# Patient Record
Sex: MALE | Race: WHITE | NOT HISPANIC OR LATINO | URBAN - METROPOLITAN AREA
[De-identification: names, ages, dates, MRNs, and addresses within clinical notes are randomized per-mention and may not be internally consistent; named-entity substitution may affect disease eponyms.]

---

## 2018-05-15 ENCOUNTER — OFFICE VISIT CONVERTED (OUTPATIENT)
Dept: FAMILY MEDICINE CLINIC | Facility: CLINIC | Age: 83
End: 2018-05-15
Attending: NURSE PRACTITIONER

## 2018-05-25 ENCOUNTER — CONVERSION ENCOUNTER (OUTPATIENT)
Dept: SURGERY | Facility: CLINIC | Age: 83
End: 2018-05-25

## 2018-05-25 ENCOUNTER — OFFICE VISIT CONVERTED (OUTPATIENT)
Dept: UROLOGY | Facility: CLINIC | Age: 83
End: 2018-05-25
Attending: UROLOGY

## 2018-06-22 ENCOUNTER — CONVERSION ENCOUNTER (OUTPATIENT)
Dept: SURGERY | Facility: CLINIC | Age: 83
End: 2018-06-22

## 2018-06-22 ENCOUNTER — PROCEDURE VISIT CONVERTED (OUTPATIENT)
Dept: UROLOGY | Facility: CLINIC | Age: 83
End: 2018-06-22
Attending: UROLOGY

## 2018-07-02 ENCOUNTER — OFFICE VISIT CONVERTED (OUTPATIENT)
Dept: FAMILY MEDICINE CLINIC | Facility: CLINIC | Age: 83
End: 2018-07-02
Attending: NURSE PRACTITIONER

## 2018-07-02 ENCOUNTER — CONVERSION ENCOUNTER (OUTPATIENT)
Dept: FAMILY MEDICINE CLINIC | Facility: CLINIC | Age: 83
End: 2018-07-02

## 2018-09-24 ENCOUNTER — PROCEDURE VISIT CONVERTED (OUTPATIENT)
Dept: UROLOGY | Facility: CLINIC | Age: 83
End: 2018-09-24
Attending: UROLOGY

## 2018-10-17 ENCOUNTER — PROCEDURE VISIT CONVERTED (OUTPATIENT)
Dept: UROLOGY | Facility: CLINIC | Age: 83
End: 2018-10-17
Attending: UROLOGY

## 2019-01-07 ENCOUNTER — HOSPITAL ENCOUNTER (OUTPATIENT)
Dept: OTHER | Facility: HOSPITAL | Age: 84
Discharge: HOME | End: 2019-01-07
Attending: INTERNAL MEDICINE

## 2019-01-07 LAB
INR PPP: 3.08 (ref 2–3)
PROTHROMBIN TIME: 28.6 S (ref 9.4–12)

## 2019-02-05 ENCOUNTER — HOSPITAL ENCOUNTER (OUTPATIENT)
Dept: OTHER | Facility: HOSPITAL | Age: 84
Discharge: HOME | End: 2019-02-05
Attending: INTERNAL MEDICINE

## 2019-02-05 LAB
INR PPP: 2.9 (ref 2–3)
PROTHROMBIN TIME: 27 S (ref 9.4–12)

## 2019-03-06 ENCOUNTER — HOSPITAL ENCOUNTER (OUTPATIENT)
Dept: OTHER | Facility: HOSPITAL | Age: 84
Discharge: HOME | End: 2019-03-06
Attending: INTERNAL MEDICINE

## 2019-03-06 LAB
INR PPP: 2.49 (ref 2–3)
PROTHROMBIN TIME: 23.3 S (ref 9.4–12)

## 2019-03-27 ENCOUNTER — PROCEDURE VISIT CONVERTED (OUTPATIENT)
Dept: UROLOGY | Facility: CLINIC | Age: 84
End: 2019-03-27
Attending: UROLOGY

## 2019-03-27 ENCOUNTER — HOSPITAL ENCOUNTER (OUTPATIENT)
Dept: SURGERY | Facility: CLINIC | Age: 84
Discharge: HOME | End: 2019-03-27
Attending: UROLOGY

## 2019-03-27 ENCOUNTER — CONVERSION ENCOUNTER (OUTPATIENT)
Dept: SURGERY | Facility: CLINIC | Age: 84
End: 2019-03-27

## 2019-03-27 ENCOUNTER — HOSPITAL ENCOUNTER (OUTPATIENT)
Dept: OTHER | Facility: HOSPITAL | Age: 84
Discharge: HOME | End: 2019-03-27
Attending: UROLOGY

## 2019-03-27 LAB
ANION GAP SERPL CALC-SCNC: 18 MMOL/L (ref 8–19)
BUN SERPL-MCNC: 38 MG/DL (ref 5–25)
BUN/CREAT SERPL: 23 {RATIO} (ref 6–20)
CALCIUM SERPL-MCNC: 9.5 MG/DL (ref 8.7–10.4)
CHLORIDE SERPL-SCNC: 102 MMOL/L (ref 99–111)
CONV CO2: 24 MMOL/L (ref 22–32)
CREAT UR-MCNC: 1.65 MG/DL (ref 0.7–1.2)
GFR SERPLBLD BASED ON 1.73 SQ M-ARVRAT: 36 ML/MIN/{1.73_M2}
GLUCOSE SERPL-MCNC: 118 MG/DL (ref 70–99)
INR PPP: 1.98 (ref 2–3)
OSMOLALITY SERPL CALC.SUM OF ELEC: 298 MOSM/KG (ref 273–304)
POTASSIUM SERPL-SCNC: 4.8 MMOL/L (ref 3.5–5.3)
PROTHROMBIN TIME: 18.8 S (ref 9.4–12)
SODIUM SERPL-SCNC: 139 MMOL/L (ref 135–147)

## 2019-03-29 LAB
AMOXICILLIN+CLAV SUSC ISLT: 4
AMPICILLIN SUSC ISLT: 8
AMPICILLIN+SULBAC SUSC ISLT: 4
BACTERIA UR CULT: ABNORMAL
CEFAZOLIN SUSC ISLT: <=4
CEFEPIME SUSC ISLT: <=1
CEFTAZIDIME SUSC ISLT: <=1
CEFTRIAXONE SUSC ISLT: <=1
CEFUROXIME ORAL SUSC ISLT: 16
CEFUROXIME PARENTER SUSC ISLT: 16
CIPROFLOXACIN SUSC ISLT: >=4
ERTAPENEM SUSC ISLT: <=0.5
GENTAMICIN SUSC ISLT: <=1
LEVOFLOXACIN SUSC ISLT: >=8
NITROFURANTOIN SUSC ISLT: <=16
TETRACYCLINE SUSC ISLT: 2
TMP SMX SUSC ISLT: <=20
TOBRAMYCIN SUSC ISLT: <=1

## 2019-04-19 ENCOUNTER — PROCEDURE VISIT CONVERTED (OUTPATIENT)
Dept: UROLOGY | Facility: CLINIC | Age: 84
End: 2019-04-19
Attending: UROLOGY

## 2019-04-26 ENCOUNTER — HOSPITAL ENCOUNTER (OUTPATIENT)
Dept: OTHER | Facility: HOSPITAL | Age: 84
Discharge: HOME | End: 2019-04-26
Attending: UROLOGY

## 2019-04-26 LAB
INR PPP: 2.08 (ref 2–3)
PROTHROMBIN TIME: 19.6 S (ref 9.4–12)

## 2019-05-30 ENCOUNTER — HOSPITAL ENCOUNTER (OUTPATIENT)
Dept: OTHER | Facility: HOSPITAL | Age: 84
Discharge: HOME | End: 2019-05-30
Attending: INTERNAL MEDICINE

## 2019-05-30 LAB
INR PPP: 2.79 (ref 2–3)
PROTHROMBIN TIME: 26 S (ref 9.4–12)

## 2019-06-25 ENCOUNTER — HOSPITAL ENCOUNTER (OUTPATIENT)
Dept: OTHER | Facility: HOSPITAL | Age: 84
Discharge: HOME | End: 2019-06-25
Attending: INTERNAL MEDICINE

## 2019-06-25 ENCOUNTER — OFFICE VISIT CONVERTED (OUTPATIENT)
Dept: FAMILY MEDICINE CLINIC | Facility: CLINIC | Age: 84
End: 2019-06-25
Attending: NURSE PRACTITIONER

## 2019-06-25 LAB
INR PPP: 2.77 (ref 2–3)
PROTHROMBIN TIME: 25.8 S (ref 9.4–12)

## 2019-06-26 ENCOUNTER — HOSPITAL ENCOUNTER (OUTPATIENT)
Dept: OTHER | Facility: HOSPITAL | Age: 84
Discharge: HOME | End: 2019-06-26
Attending: NURSE PRACTITIONER

## 2019-06-26 LAB
ALBUMIN SERPL-MCNC: 4.2 G/DL (ref 3.5–5)
ALBUMIN/GLOB SERPL: 1.4 {RATIO} (ref 1.4–2.6)
ALP SERPL-CCNC: 77 U/L (ref 56–155)
ALT SERPL-CCNC: 9 U/L (ref 10–40)
ANION GAP SERPL CALC-SCNC: 18 MMOL/L (ref 8–19)
APPEARANCE UR: ABNORMAL
AST SERPL-CCNC: 13 U/L (ref 15–50)
BASOPHILS # BLD AUTO: 0.02 10*3/UL (ref 0–0.2)
BASOPHILS NFR BLD AUTO: 0.3 % (ref 0–3)
BILIRUB SERPL-MCNC: 0.5 MG/DL (ref 0.2–1.3)
BILIRUB UR QL: NEGATIVE
BUN SERPL-MCNC: 47 MG/DL (ref 5–25)
BUN/CREAT SERPL: 28 {RATIO} (ref 6–20)
CALCIUM SERPL-MCNC: 9.3 MG/DL (ref 8.7–10.4)
CHLORIDE SERPL-SCNC: 101 MMOL/L (ref 99–111)
CHOLEST SERPL-MCNC: 146 MG/DL (ref 107–200)
CHOLEST/HDLC SERPL: 3.4 {RATIO} (ref 3–6)
COLOR UR: YELLOW
CONV ABS IMM GRAN: 0.02 10*3/UL (ref 0–0.2)
CONV BACTERIA: ABNORMAL
CONV CO2: 24 MMOL/L (ref 22–32)
CONV COLLECTION SOURCE (UA): ABNORMAL
CONV CREATININE URINE, RANDOM: 89.6 MG/DL (ref 10–300)
CONV IMMATURE GRAN: 0.3 % (ref 0–1.8)
CONV MICROALBUM.,U,RANDOM: 14.3 MG/L (ref 0–20)
CONV TOTAL PROTEIN: 7.2 G/DL (ref 6.3–8.2)
CONV UROBILINOGEN IN URINE BY AUTOMATED TEST STRIP: 0.2 {EHRLICHU}/DL (ref 0.1–1)
CREAT UR-MCNC: 1.67 MG/DL (ref 0.7–1.2)
DEPRECATED RDW RBC AUTO: 51.3 FL (ref 35.1–43.9)
EOSINOPHIL # BLD AUTO: 0.17 10*3/UL (ref 0–0.7)
EOSINOPHIL # BLD AUTO: 2.9 % (ref 0–7)
ERYTHROCYTE [DISTWIDTH] IN BLOOD BY AUTOMATED COUNT: 14.8 % (ref 11.6–14.4)
FERRITIN SERPL-MCNC: 32 NG/ML (ref 30–300)
FOLATE SERPL-MCNC: >20 NG/ML (ref 4.8–20)
GFR SERPLBLD BASED ON 1.73 SQ M-ARVRAT: 35 ML/MIN/{1.73_M2}
GLOBULIN UR ELPH-MCNC: 3 G/DL (ref 2–3.5)
GLUCOSE SERPL-MCNC: 112 MG/DL (ref 70–99)
GLUCOSE UR QL: NEGATIVE MG/DL
HBA1C MFR BLD: 11.2 G/DL (ref 14–18)
HCT VFR BLD AUTO: 36.2 % (ref 42–52)
HDLC SERPL-MCNC: 43 MG/DL (ref 40–60)
HGB UR QL STRIP: ABNORMAL
IRON SATN MFR SERPL: 32 % (ref 20–55)
IRON SERPL-MCNC: 116 UG/DL (ref 70–180)
KETONES UR QL STRIP: NEGATIVE MG/DL
LDLC SERPL CALC-MCNC: 86 MG/DL (ref 70–100)
LEUKOCYTE ESTERASE UR QL STRIP: ABNORMAL
LYMPHOCYTES # BLD AUTO: 1.4 10*3/UL (ref 1–5)
MCH RBC QN AUTO: 29 PG (ref 27–31)
MCHC RBC AUTO-ENTMCNC: 30.9 G/DL (ref 33–37)
MCV RBC AUTO: 93.8 FL (ref 80–96)
MICROALBUMIN/CREAT UR: 16 MG/G{CRE} (ref 0–25)
MONOCYTES # BLD AUTO: 0.66 10*3/UL (ref 0.2–1.2)
MONOCYTES NFR BLD AUTO: 11.2 % (ref 3–10)
NEUTROPHILS # BLD AUTO: 3.62 10*3/UL (ref 2–8)
NEUTROPHILS NFR BLD AUTO: 61.5 % (ref 30–85)
NITRITE UR QL STRIP: NEGATIVE
NRBC CBCN: 0 % (ref 0–0.7)
OSMOLALITY SERPL CALC.SUM OF ELEC: 299 MOSM/KG (ref 273–304)
PH UR STRIP.AUTO: 6 [PH] (ref 5–8)
PLATELET # BLD AUTO: 203 10*3/UL (ref 130–400)
PMV BLD AUTO: 11.7 FL (ref 9.4–12.4)
POTASSIUM SERPL-SCNC: 4.7 MMOL/L (ref 3.5–5.3)
PROT UR QL: NEGATIVE MG/DL
RBC # BLD AUTO: 3.86 10*6/UL (ref 4.7–6.1)
RBC #/AREA URNS HPF: ABNORMAL /[HPF]
SODIUM SERPL-SCNC: 138 MMOL/L (ref 135–147)
SP GR UR: 1.02 (ref 1–1.03)
T4 FREE SERPL-MCNC: 1.3 NG/DL (ref 0.9–1.8)
TIBC SERPL-MCNC: 362 UG/DL (ref 245–450)
TRANSFERRIN SERPL-MCNC: 253 MG/DL (ref 215–365)
TRIGL SERPL-MCNC: 87 MG/DL (ref 40–150)
TSH SERPL-ACNC: 5.61 M[IU]/L (ref 0.27–4.2)
VARIANT LYMPHS NFR BLD MANUAL: 23.8 % (ref 20–45)
VIT B12 SERPL-MCNC: 680 PG/ML (ref 211–911)
VLDLC SERPL-MCNC: 17 MG/DL (ref 5–37)
WBC # BLD AUTO: 5.89 10*3/UL (ref 4.8–10.8)
WBC #/AREA URNS HPF: ABNORMAL /[HPF]

## 2019-07-26 ENCOUNTER — HOSPITAL ENCOUNTER (OUTPATIENT)
Dept: OTHER | Facility: HOSPITAL | Age: 84
Discharge: HOME | End: 2019-07-26
Attending: INTERNAL MEDICINE

## 2019-07-26 LAB
INR PPP: 2.61 (ref 2–3)
PROTHROMBIN TIME: 24.4 S (ref 9.4–12)

## 2019-08-09 ENCOUNTER — HOSPITAL ENCOUNTER (OUTPATIENT)
Dept: OTHER | Facility: HOSPITAL | Age: 84
Discharge: HOME | End: 2019-08-09
Attending: INTERNAL MEDICINE

## 2019-08-09 LAB
T4 FREE SERPL-MCNC: 1.7 NG/DL (ref 0.9–1.8)
TSH SERPL-ACNC: 0.98 M[IU]/L (ref 0.27–4.2)

## 2019-08-29 ENCOUNTER — HOSPITAL ENCOUNTER (OUTPATIENT)
Dept: OTHER | Facility: HOSPITAL | Age: 84
Discharge: HOME | End: 2019-08-29
Attending: INTERNAL MEDICINE

## 2019-08-29 LAB
INR PPP: 3.4 (ref 2–3)
PROTHROMBIN TIME: 31.6 S (ref 9.4–12)

## 2019-09-12 ENCOUNTER — OFFICE VISIT CONVERTED (OUTPATIENT)
Dept: FAMILY MEDICINE CLINIC | Facility: CLINIC | Age: 84
End: 2019-09-12
Attending: NURSE PRACTITIONER

## 2019-09-18 ENCOUNTER — OFFICE VISIT CONVERTED (OUTPATIENT)
Dept: FAMILY MEDICINE CLINIC | Facility: CLINIC | Age: 84
End: 2019-09-18
Attending: NURSE PRACTITIONER

## 2019-09-23 ENCOUNTER — HOSPITAL ENCOUNTER (OUTPATIENT)
Dept: OTHER | Facility: HOSPITAL | Age: 84
Discharge: HOME | End: 2019-09-23
Attending: INTERNAL MEDICINE

## 2019-09-23 LAB
INR PPP: 3.36 (ref 2–3)
PROTHROMBIN TIME: 31.2 S (ref 9.4–12)

## 2019-10-23 ENCOUNTER — PROCEDURE VISIT CONVERTED (OUTPATIENT)
Dept: UROLOGY | Facility: CLINIC | Age: 84
End: 2019-10-23
Attending: UROLOGY

## 2019-10-29 ENCOUNTER — HOSPITAL ENCOUNTER (OUTPATIENT)
Dept: OTHER | Facility: HOSPITAL | Age: 84
Discharge: HOME | End: 2019-10-29
Attending: INTERNAL MEDICINE

## 2019-10-29 LAB
INR PPP: 3.9 (ref 2–3)
PROTHROMBIN TIME: 37.8 S (ref 9.4–12)

## 2019-12-10 ENCOUNTER — OFFICE VISIT CONVERTED (OUTPATIENT)
Dept: SURGERY | Facility: CLINIC | Age: 84
End: 2019-12-10
Attending: PHYSICIAN ASSISTANT

## 2021-05-07 NOTE — PROGRESS NOTES
"   Progress Note      Patient Name: Christoph Qureshi   Patient ID: [id number]   Sex: Male   YOB: 1928        Visit Date: May 15, 2018    Provider: BOBBY Ladd   Location: [facility]   Location Address: [address] [place] [address]  [id number]   Location Phone: [phone]          Chief Complaint     PATIENT IS HERE BECAUSE [place] [facility]  BECAUSE HIS NECK WAS HURTING AND STIFF.  THEY GAVE HIM A MUSCLE RELAXER SHOT AND GAVE HIM SOME GEL TO PUT ON IT.  THEY HAVE THE GEL WITH THEM.  HE ALSO HAS HIS PAPER WORK FROM THE ER WITH HIM.       History Of Present Illness  Christoph Qureshi is a 89 year old /White male who presents for evaluation and treatment of:      follow up from ER for severe neck pain--    He could hardly turn his head on Saturday or Sunday so he finally broke down and went to the ER yesterday. He states that they gave him a \"muscle relaxer\" shot and he is a lot better today. They also gave him topical Voltaren gel. He has been prescribed this in the past; however, Dr. Salgado doesn't want him using any NSAIDs due to the warfarin--not even a baby aspirin.       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 --  Left kidney         Past Surgical History  Procedure Name Date Notes   CABG --  1997   Gallbladder --  --    Hip relacement, left --  --    Hip replacement, right --  --    Kidney removal --  left 2017   Valve replacement, aortic and mitral --  --          Medication List  Name Date Started Instructions   ferrous sulfate 325 mg (65 mg iron) oral tablet  take 1 tablet (325 mg) by oral route once daily   furosemide 40 mg oral tablet  take 1 tablet (40 mg) by oral route once daily   levothyroxine 50 mcg oral " "tablet 03/06/2018 TAKE 1 TABLET DAILY.   lisinopril 5 mg oral tablet  take 1 tablet (5 mg) by oral route once daily   metoprolol succinate 25 mg oral tablet extended release 24 hr  take 1 tablet (25 mg) by oral route once daily   Probiotic 15 billion cell oral capsule  take 2 capsules by oral route   simvastatin 80 mg oral tablet  take 1 tablet by oral route   warfarin 5 mg oral tablet  take 1 & 1/2-2 tablets by mouth         Allergy List  Allergen Name Date Reaction Notes   Codiene --  --  --          Family Medical History  Disease Name Relative/Age Notes   Cancer, Unspecified / --     Sister/          Social History  Finding Status Start/Stop Quantity Notes   Alcohol Never --/-- --  --    lives with spouse --  --/-- --  --    Retired --  --/-- --  --    Tobacco Never --/-- --  --          Review of Systems  · Constitutional  o Admits  o : good general health lately  · Cardiovascular  o Denies  o : chest pain, dyspnea on exertion  · Respiratory  o Denies  o : shortness of breath, wheezing, cough  · Gastrointestinal  o Denies  o : nausea, vomiting, diarrhea, abdominal pain  · Neurologic  o Denies  o : tingling or numbness  · Musculoskeletal  o Admits  o : limitation of motion, neck pain      Vitals  Date Time BP Position Site L\R Cuff Size HR RR TEMP(F) WT  HT  BMI kg/m2 BSA m2 O2 Sat        05/15/2018 12:34 /64 Sitting    81 - R  98.3 155lbs 4oz 5'  4\" 26.65 1.78 97 %           Physical Examination  · Constitutional  o Appearance  o : well developed, well-nourished, in no acute distress  · Neck  o Inspection/Palpation  o : supple  o Thyroid  o : no thyromegaly  · Respiratory  o Respiratory Effort  o : breathing unlabored  o Auscultation of Lungs  o : clear to ascultation  · Cardiovascular  o Heart  o :   § Auscultation of Heart  § : regular rate and rhythm  o Peripheral Vascular System  o :   § Extremities  § : no edema  · Lymphatic  o Neck  o : no lymphadenopathy present  · Musculoskeletal  o General  o : " "  § General Musculoskeletal  § : No joint swelling or deformity. Muscle tone, strength, and development grossly normal.  · Skin and Subcutaneous Tissue  o General Inspection  o : no lesions present, no areas of discoloration, skin turgor normal, texture normal  · Neurologic  o Gait and Station  o :   § Gait Screening  § : normal gait  · Psychiatric  o Mood and Affect  o : mood normal, affect appropriate  · Cervical Spine  o Inspection  o : no redness, no swelling, no atrophy, no deformity, no mass  o Palpation  o : tednerness present on the left (paraspinal), no crepitus, no masses  o Range of Motion  o : flexion normal, hyperextension normal, lateral bending normal, axial rotation abnormal               Assessment  · Cervical pain (neck)     723.1/M54.2  · DDD (degenerative disc disease), cervical     722.4/M50.30      Plan  · Orders  o ACO-19: Colorectal cancer screening results documented and reviewed (3017F) - - 05/15/2018   Normal in 5/2014--Dr. Hernandez  o ACO-39: Current medications updated and reviewed () - - 05/15/2018  o Xray cervical spine complete [facility] ([id number]) - 723.1/M54.2 - 05/15/2018   Severe arthritic and degenerative disc disease changes of the cervical spine.  · Medications  o cyclobenzaprine 5 mg oral tablet   SIG: take 1 tablet (5 mg) by oral route 3 times per day as needed for neck pain. hold for somnolence   DISP: (21) tablets with 0 refills  Prescribed on 05/15/2018     · Instructions  o Patient was educated/instructed on their diagnosis, treatment and medications prior to discharge from the clinic today. He has severe DDD of the C-spine and arthritis--he cannot take NSAIDs--neck pain is improving with \"muscle relaxer shot\", although ER records do not show any injections given. I have recommended PRN use of Flexeril, only 5mg as needed. He is going to verify with Dr. Salgado if this is okay and let me know.   · Disposition  o Call or Return if symptoms worsen or " persist.            Electronically Signed by: BOBBY Ladd -Author on May 15, 2018 01:54:09 PM

## 2021-05-07 NOTE — PROGRESS NOTES
Progress Note      Patient Name: Christoph Qureshi   Patient ID: [id number]   Sex: Male   YOB: 1928        Visit Date: June 25, 2019    Provider: BOBBY Ladd   Location: [facility]   Location Address: [address] [place] [address] [place]  [address]   Location Phone: [phone]          Chief Complaint     refill on thyroid med       History Of Present Illness  Christoph Qureshi is a 89 year old /White male who presents for evaluation and treatment of:      follow up on chronic health conditions -- needs thyroid labs and levothyroxine.     Kidney cancer -- he had surgery 2 years ago to remove the left kidney -- and he still follows up every 6 months. He recently had a cystoscopy in March and all was well.     CHF/HTN/HLD -- he sees Dr. Salgado about once per year -- he hasn't checked any labs in 2 years per his wife. He is prescribed furosemide, warfarin, metoprolol, Lisinopril, and simvastatin.     Hypothyroidism -- no change in fatigue -- he tires easily, but no change from baseline. No issues with hair. He bruises easily d/t warfarin. No change in nails. No issues with constipation.       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 --  Left kidney         Past Surgical History  Procedure Name Date Notes   CABG --  1997   Gallbladder --  --    Hip relacement, left --  --    Hip replacement, right --  --    Kidney removal --  left 2017   Valve replacement, aortic and mitral --  --          Medication List  Name Date Started Instructions   furosemide 40 mg oral tablet  take 1 tablet (40 mg) by oral route once daily   levothyroxine 50 mcg oral tablet 09/19/2018 take 1 tablet by oral route QD for 90 days, take 1 xtra pill on Saturday.   lisinopril 5 mg oral  tablet  take 1 tablet (5 mg) by oral route once daily   metoprolol succinate 25 mg oral tablet extended release 24 hr  take 1 tablet (25 mg) by oral route once daily   simvastatin 80 mg oral tablet  take 1 tablet by oral route   warfarin 5 mg oral tablet  take 1 & 1/2-2 tablets by mouth         Allergy List  Allergen Name Date Reaction Notes   Codiene --  --  --          Family Medical History  Disease Name Relative/Age Notes   Cancer, Unspecified Sister/   --          Social History  Finding Status Start/Stop Quantity Notes   Alcohol Never --/-- --  --    lives with spouse --  --/-- --  --    Retired --  --/-- --  --    Tobacco Former --/-- --  quit 1959         Immunizations  NameDate Admin Mfg Trade Name Lot Number Route Inj VIS Given VIS Publication   Gvqafyceh77/21/2018 [facility] FLUZONE-HIGH DOSE [id number] IM RD 09/21/2018 08/07/2015   Comments: [id number]         Review of Systems  · Constitutional  o Admits  o : fatigue  o Denies  o : fever, chills  · Eyes  o Denies  o : changes in vision  · HENT  o Denies  o : headaches, vertigo, lightheadedness  · Cardiovascular  o Denies  o : chest pain, syncope, dyspnea on exertion, lower extremity edema, palpitations  · Respiratory  o Denies  o : shortness of breath  · Gastrointestinal  o Denies  o : nausea, vomiting, diarrhea, abdominal pain  · Genitourinary  o Denies  o : dysuria, urinary retention  · Integument  o Denies  o : pigmentation changes, nail changes  · Neurologic  o Denies  o : dizziness  · Musculoskeletal  o Denies  o : joint pain, joint swelling  · Endocrine  o Denies  o : polyuria, polydipsia, loss of hair, cold intolerance, heat intolerance  · Heme-Lymph  o Admits  o : easy bruising  o Denies  o : easy bleeding, petechiae      Vitals  Date Time BP Position Site L\R Cuff Size HR RR TEMP (F) WT  HT  BMI kg/m2 BSA m2 O2 Sat        06/25/2019 10:58 /80 Sitting    78 - R  97.4 159lbs 0oz    100 %          Physical  Examination  · Constitutional  o Appearance  o : well developed, well-nourished, in no acute distress  · Eyes  o Conjunctivae  o : conjunctivae normal, no exudates present  · Neck  o Inspection/Palpation  o : normal appearance, no masses or tenderness, trachea midline  o Thyroid  o : no thyromegaly  · Respiratory  o Respiratory Effort  o : breathing unlabored  o Auscultation of Lungs  o : clear to ascultation  · Cardiovascular  o Heart  o :   § Auscultation of Heart  § : regular rate, irregular rhythm present, no murmurs present  o Peripheral Vascular System  o :   § Carotid Arteries  § : normal pulses bilaterally, no bruits present  § Pedal Pulses  § : bilateral pulse strength 2+  § Extremities  § : no edema  · Lymphatic  o Neck  o : no lymphadenopathy present  · Musculoskeletal  o General  o :   § General Musculoskeletal  § : Muscle tone, strength, and development grossly normal.  · Skin and Subcutaneous Tissue  o General Inspection  o : no lesions present, no areas of discoloration, skin turgor normal, texture normal  · Neurologic  o Gait and Station  o :   § Gait Screening  § : normal gait  · Psychiatric  o Mood and Affect  o : mood normal, affect appropriate          Assessment  · Anemia     285.9/D64.9  · Essential hypertension     401.9/I10  · Hyperlipidemia     272.4/E78.5  · Hypothyroidism     244.9/E03.9  · Congestive Heart Failure     428.0/I50.9      Plan  · Orders  o CBC with Auto Diff [facility] (42570) - 401.9/I10, 272.4/E78.5, 285.9/D64.9, 244.9/E03.9 - 06/25/2019  o CMP [facility] (58095) - 401.9/I10, 272.4/E78.5, 285.9/D64.9, 244.9/E03.9 - 06/25/2019  o Lipid Panel [facility] (66883) - 401.9/I10, 272.4/E78.5 - 06/25/2019  o Thyroid Profile (THYII) - 244.9/E03.9 - 06/25/2019  o Urinalysis with Reflex Microscopy if abnormal ([facility]) (09880) - 401.9/I10, 272.4/E78.5 - 06/25/2019  o Urine microalbumin (57707) - 401.9/I10, 272.4/E78.5 - 06/25/2019  o ACO-13: Fall Risk Screening with no falls in past year or only one fall  without injury in the past year (1101F) - - 06/25/2019  o ACO-39: Current medications updated and reviewed () - - 06/25/2019  o Iron Profile (Iron 67137 TIBC 93506 and Transferrin 96863) (IRONP) - 285.9/D64.9 - 06/25/2019  o Ferritin ser/plas (77258) - 285.9/D64.9 - 06/25/2019  o B12 Folate levels (B12FO) - 285.9/D64.9 - 06/25/2019  · Instructions  o Patient advised to monitor blood pressure (B/P) at home and journal readings. Patient informed that a B/P reading at home of more than 135/85 is considered hypertension. For readings greater javi444/90 or higher patient is advised to follow up in the office with readings for management. Patient advised to limit sodium intake.  o Take all medications as prescribed/directed.  o Patient was educated/instructed on their diagnosis, treatment and medications prior to discharge from the clinic today. Patient does not see nephrology -- if eGFR still low with this lab draw then he will be referred -- voices understanding and is okay to be seen in [place].   o Chronic conditions reviewed and taken into consideration for today's treatment plan.  · Disposition  o Call or Return if symptoms worsen or persist.            Electronically Signed by: BOBBY Ladd -Author on June 25, 2019 11:32:38 AM
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
## 2021-05-07 NOTE — PROGRESS NOTES
Progress Note      Patient Name: Christoph Qureshi   Patient ID: [id number]   Sex: Male   YOB: 1928    Referring Provider: Tala ROSALES    Visit Date: July 2, 2018    Provider: BOBBY Byrd   Location: [facility]   Location Address: [address] [place] [address]  [id number]   Location Phone: [phone]          Chief Complaint     refill levothyroxine and labs if needed       History Of Present Illness  Christoph Qureshi is a 89 year old /White male who presents for evaluation and treatment of:      chronic  health conditions and rf medications     Had kidney removed last year for left kidney cancer- had follow up a few weeks ago- cystoscope all clear- was having some trouble with voiding.    Got 2 units of blood for anemia july 2017.     Sees Dr Salgado 2 x year- next apt November. He manages Coumadin and RF cardiac meds    Needs thyroid medication -  feels more fatigued but attributes to age. No change in skin or hair, no stool changes.       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 hold for somnolence   ferrous sulfate 325 mg (65 mg iron) oral tablet  take 1 tablet (325 mg) by oral route once daily   furosemide 40 mg oral tablet  take 1 tablet (40 mg) by oral route once daily   levothyroxine 50 mcg oral  tablet 05/29/2018 TAKE 1 TABLET DAILY and SCHEDULE A FOLLOW UP APPOINTMENT IN THE OFFICE FOR LABS   lisinopril 5 mg oral tablet  take 1 tablet (5 mg) by oral route once daily   metoprolol succinate 25 mg oral tablet extended release 24 hr  take 1 tablet (25 mg) by oral route once daily   Probiotic 15 billion cell oral capsule  take 2 capsules by oral route   simvastatin 80 mg oral tablet  take 1 tablet by oral route   warfarin 5 mg oral tablet  take 1 & 1/2-2 tablets by mouth         Allergy List  Allergen Name Date Reaction Notes   Codiene --  --  --          Family Medical History  Disease Name Relative/Age Notes   Cancer, Unspecified / --     Sister/          Social History  Finding Status Start/Stop Quantity Notes   Alcohol Never --/-- --  --    lives with spouse --  --/-- --  --    Retired --  --/-- --  --    Tobacco Former --/-- --  quit 1959         Review of Systems  · Constitutional  o Admits  o : fatigue, weight loss, good general health lately  o Denies  o : malaise, recent weight changes   · Eyes  o Denies  o : blurred or double vision  · HENT  o Denies  o : hearing loss or ringing, chronic sinus problem, swollen glands in neck  · Cardiovascular  o Denies  o : chest pain, palpitations (fast, fluttering, or skipping beats), swelling (feet, ankles, hands), shortness of breath while walking or lying flat  · Respiratory  o Denies  o : shortness of breath, asthma or wheezing, COPD  · Gastrointestinal  o Denies  o : ulcers, nausea or vomiting  · Neurologic  o Denies  o : lightheaded or dizzy, stroke, headaches  · Musculoskeletal  o Denies  o : joint pain, back pain  · Endocrine  o Admits  o : thyroid disease  o Denies  o : heat or cold intolerance, excessive thirst or urination  · Heme-Lymph  o Denies  o : bleeding or bruising tendency, anemia      Vitals  Date Time BP Position Site L\R Cuff Size HR RR TEMP(F) WT  HT  BMI kg/m2 BSA m2 O2 Sat HC       07/02/2018 02:06 /62 Sitting    48 - R  97.8 156lbs  "0oz 5'  4\" 26.78 1.79 97 %           Physical Examination  · Constitutional  o Appearance  o : well developed, well-nourished, in no acute distress  · Head and Face  o HEENT  o : Unremarkable  · Eyes  o Conjunctivae  o : conjunctivae normal  o Pupils and Irises  o : pupils equal and round  · Ears, Nose, Mouth and Throat  o Ears  o :   § External Ears  § : appearance within normal limits, no lesions present  o Nose  o :   § External Nose  § : appearance normal  o Oral Cavity  o :   § Oral Mucosa  § : oral mucosa normal  § Lips  § : lip appearance normal  · Neck  o Inspection/Palpation  o : supple  o Thyroid  o : no thyromegaly  · Respiratory  o Respiratory Effort  o : breathing unlabored  o Auscultation of Lungs  o : clear to ascultation  · Cardiovascular  o Heart  o :   § Auscultation of Heart  § : regular rate and rhythm  o Peripheral Vascular System  o :   § Extremities  § : no edema  · Lymphatic  o Neck  o : no lymphadenopathy present  · Musculoskeletal  o General  o :   § General Musculoskeletal  § : Muscle tone, strength, and development grossly normal.  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     189.0/C64.9  · Hypertension     401.9/I10      Plan  · Orders  o CBC with Auto Diff [facility] ([id number]) - 401.9/I10 - 07/02/2018  o CMP [facility] ([id number]) - 401.9/I10 - 07/02/2018  o Lipid Panel [facility] ([id number]) - 401.9/I10 - 07/02/2018  o TSH [facility] ([id number]) - 244.9/E03.9 - 07/02/2018  o ACO-39: Current medications updated and reviewed () - - 07/02/2018  · Medications  o levothyroxine 50 mcg oral tablet   SIG: take 1 tablet by oral route daily for 90 " days   DISP: (90) Tablet with 1 refills  Adjusted on 07/02/2018     · Instructions  o Advised that cheeses and other sources of dairy fats, animal fats, fast food, and the extras (candy, pasteries, pies, doughnuts and cookies) all contain LDL raising nutrients. Advised to increase fruits, vegetables, whole grains, and to monitor portion sizes.   o Patient was educated/instructed on their diagnosis, treatment and medications prior to discharge from the clinic today.            Electronically Signed by: BOBBY Byrd -Author on July 7, 2018 09:22:33 AM
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
## 2021-05-09 VITALS
HEART RATE: 80 BPM | SYSTOLIC BLOOD PRESSURE: 110 MMHG | WEIGHT: 156 LBS | DIASTOLIC BLOOD PRESSURE: 50 MMHG | OXYGEN SATURATION: 95 % | TEMPERATURE: 97.9 F

## 2021-05-09 VITALS
HEART RATE: 89 BPM | DIASTOLIC BLOOD PRESSURE: 60 MMHG | OXYGEN SATURATION: 100 % | TEMPERATURE: 97.5 F | SYSTOLIC BLOOD PRESSURE: 114 MMHG | WEIGHT: 160.19 LBS

## 2021-05-09 VITALS
DIASTOLIC BLOOD PRESSURE: 64 MMHG | HEART RATE: 81 BPM | SYSTOLIC BLOOD PRESSURE: 118 MMHG | HEIGHT: 64 IN | OXYGEN SATURATION: 97 % | BODY MASS INDEX: 26.5 KG/M2 | TEMPERATURE: 98.3 F | WEIGHT: 155.25 LBS

## 2021-05-09 VITALS
HEIGHT: 64 IN | DIASTOLIC BLOOD PRESSURE: 62 MMHG | TEMPERATURE: 97.8 F | HEART RATE: 48 BPM | WEIGHT: 156 LBS | OXYGEN SATURATION: 97 % | SYSTOLIC BLOOD PRESSURE: 120 MMHG | BODY MASS INDEX: 26.63 KG/M2

## 2021-05-09 VITALS
HEART RATE: 78 BPM | OXYGEN SATURATION: 100 % | DIASTOLIC BLOOD PRESSURE: 80 MMHG | TEMPERATURE: 97.4 F | SYSTOLIC BLOOD PRESSURE: 140 MMHG | WEIGHT: 159 LBS

## 2021-05-15 VITALS — BODY MASS INDEX: 26.49 KG/M2 | RESPIRATION RATE: 14 BRPM | HEIGHT: 65 IN | WEIGHT: 159 LBS

## 2021-05-15 VITALS — OXYGEN SATURATION: 98 % | RESPIRATION RATE: 15 BRPM | HEART RATE: 87 BPM

## 2021-05-15 VITALS — WEIGHT: 159 LBS | HEIGHT: 65 IN | BODY MASS INDEX: 26.49 KG/M2 | RESPIRATION RATE: 14 BRPM

## 2021-05-16 VITALS — WEIGHT: 157 LBS | HEIGHT: 66 IN | RESPIRATION RATE: 16 BRPM | BODY MASS INDEX: 25.23 KG/M2

## 2021-05-16 VITALS — RESPIRATION RATE: 14 BRPM | BODY MASS INDEX: 25.83 KG/M2 | HEIGHT: 65 IN | WEIGHT: 155 LBS

## 2021-05-16 VITALS — HEIGHT: 65 IN | RESPIRATION RATE: 14 BRPM | BODY MASS INDEX: 25.83 KG/M2 | WEIGHT: 155 LBS
